# Patient Record
(demographics unavailable — no encounter records)

---

## 2025-02-10 NOTE — ASSESSMENT
[FreeTextEntry1] : Assessment and Plan: - Reactive Airway Disease : Patient presents with a three-month history of intermittent wheezing following a respiratory infection. Given the temporal relationship to the infection, lack of prior asthma history, and physical examination findings of wheezing, a diagnosis of reactive airway disease is most likely. - Therapeutic Interventions: Prescribe Breo inhaler, one puff once daily as a maintenance medication. Continue Albuterol as a rescue inhaler on an as-needed basis.  - Patient Education: Instructed on proper use of Breo inhaler, including rinsing and gargling after use. Explained the difference between maintenance and rescue inhalers.  - Follow-Up: Schedule a follow-up appointment in three months to reassess symptoms and medication efficacy.  - Monitoring: Patient to monitor frequency of rescue inhaler use and report any worsening of symptoms.  - Obstructive Sleep Apnea : Patient has a known history of obstructive sleep apnea and is currently using CPAP therapy. - Continue current CPAP therapy  - Ensure patient has adequate supplies for CPAP use  - Consider trial of nasal pillows if patient is interested, with caution regarding potential mouth-breathing

## 2025-02-10 NOTE — PHYSICAL EXAM
[No Acute Distress] : no acute distress [Normal Oropharynx] : normal oropharynx [Normal Appearance] : normal appearance [No Neck Mass] : no neck mass [Normal Rate/Rhythm] : normal rate/rhythm [Normal S1, S2] : normal s1, s2 [No Murmurs] : no murmurs [No Resp Distress] : no resp distress [No Abnormalities] : no abnormalities [Benign] : benign [Normal Gait] : normal gait [No Clubbing] : no clubbing [No Cyanosis] : no cyanosis [No Edema] : no edema [FROM] : FROM [Normal Color/ Pigmentation] : normal color/ pigmentation [No Focal Deficits] : no focal deficits [Oriented x3] : oriented x3 [Normal Affect] : normal affect [TextBox_68] : harsh breath sounds

## 2025-02-10 NOTE — HISTORY OF PRESENT ILLNESS
[TextBox_4] : - Summary : Patient presents with a three-month history of wheezing-like condition following a chest cold, concerned about possible asthma. - Chief Complaint (CC) : Persistent wheezing and respiratory symptoms for 3 months - History of Present Illness : Raghu with a history of obstructive sleep apnea using CPAP, presents with a three-month history of wheezing-like symptoms. The condition developed following a chest cold approximately three months ago. He was treated with antibiotics and steroids at that time. The patient reports no prior history of asthma but feels he may have developed it. He was tested for COVID and flu, both of which were negative. The wheezing is not constant but occurs intermittently but has been going on for months. 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 - Past Medical History : Obstructive Sleep Apnea (using CPAP)

## 2025-02-19 NOTE — PHYSICAL EXAM
[General Appearance - Well Developed] : well developed [General Appearance - Well Nourished] : well nourished [Normal Appearance] : normal appearance [Well Groomed] : well groomed [General Appearance - In No Acute Distress] : no acute distress [Edema] : no peripheral edema [Respiration, Rhythm And Depth] : normal respiratory rhythm and effort [Exaggerated Use Of Accessory Muscles For Inspiration] : no accessory muscle use [Abdomen Soft] : soft [Abdomen Tenderness] : non-tender [Costovertebral Angle Tenderness] : no ~M costovertebral angle tenderness [Normal Station and Gait] : the gait and station were normal for the patient's age [] : no rash [No Focal Deficits] : no focal deficits [Oriented To Time, Place, And Person] : oriented to person, place, and time [Affect] : the affect was normal [Mood] : the mood was normal [Not Anxious] : not anxious [FreeTextEntry1] : bmi = 41.1 [de-identified] : declined exam due to need for a chaperone

## 2025-02-19 NOTE — LETTER HEADER
[FreeTextEntry3] : Sean Fournier MD [facility], [address] [place] [address]

## 2025-02-19 NOTE — LETTER BODY
[Dear  ___] : Dear  [unfilled], [Courtesy Letter:] : I had the pleasure of seeing your patient, [unfilled], in my office today. [Please see my note below.] : Please see my note below. [Sincerely,] : Sincerely, [FreeTextEntry2] : Darrick Cole MD [address] [address]

## 2025-02-19 NOTE — HISTORY OF PRESENT ILLNESS
[FreeTextEntry1] : Jose A is a 57-year-old male born November 10, 1967 we were following for ED. at his last visit on 07/01/2024 we reviewed that he is  injecting without issues and without any evidence of penile fibrosis. He is responding well to Trimix. He is using Cialis when he is not using Trimix with good efficacy. After thorough review of the option elects to continue. All usage, dosage, mechanism of action, and adverse events of medication reviewed in detail and he knows not to mix Cialis and Trimix and separate them by at least 36 hours. He will follow-up in 4 months symptom next.   Trimix he is using #5 using 0.4 ml and it lasts an hour  Cialis is the 20 mg dose, and it lasts about 2 days of potentiation, and he needs several hour lead time, not one hour   [Erectile Dysfunction] : Erectile Dysfunction

## 2025-02-19 NOTE — REVIEW OF SYSTEMS
[see HPI] : see HPI [Dysuria] : no dysuria [Incontinence] : no incontinence [Nocturia] : no nocturia [Negative] : Heme/Lymph [FreeTextEntry1] : morbid obesity

## 2025-02-19 NOTE — ASSESSMENT
[FreeTextEntry1] : He tells me that things are working well enough, I have not examined the penis as he does not want me to bring in a female chaperone and he says the penis is fine.  I will renew the medication, he is aware that I will be retiring in April and that on his next visit he will see Dr. Leach.  Obviously he has the choice of switching offices about it at this office the specialist in ED is going to be Dr. Leach

## 2025-05-12 NOTE — ASSESSMENT
[FreeTextEntry1] : Assessment: Asthma:  plan: Stress compliance with inhalers. Renewed today. cont PRN albuterol Restart ICS/LABA will try budesonide/formoterol needs PFT   JENNIFFER Obesity EDS controlled   PLAN: The patient is using and benefitting from the PAP device. There is good compliance with the CPAP. New supplies will be ordered today. Weight loss maintenance discussed. I stressed the need maintain compliance with the PAP device. The patient is not to use an Ozone or UV sterilizer. The patient was educated in the absolute requirement to use the PAP device nightly     F/U 6 months

## 2025-05-12 NOTE — HISTORY OF PRESENT ILLNESS
[TextBox_4] : - Chief Complaint (CC) : Wheezing and difficulty breathing, especially when lying down - History of Present Illness : The patient presents with ongoing respiratory symptoms, primarily wheezing and difficulty breathing. These symptoms are particularly noticeable when the patient lies down to sleep, forcing them to sleep on their side rather than face-up. The patient reports a history of COVID-19, which may have contributed to the development of asthma-like symptoms. The onset of symptoms appears to be recent, possibly related to seasonal allergies or as a post-COVID complication. The patient has attempted to use an inhaler prescribed previously, but encountered insurance-related issues in obtaining the medication.  He was never started on the medication.  He states he informed the office but this is unclear.